# Patient Record
Sex: MALE | Race: WHITE | NOT HISPANIC OR LATINO | Employment: OTHER | URBAN - METROPOLITAN AREA
[De-identification: names, ages, dates, MRNs, and addresses within clinical notes are randomized per-mention and may not be internally consistent; named-entity substitution may affect disease eponyms.]

---

## 2022-01-24 ENCOUNTER — TRANSITIONAL CARE UNIT VISIT (OUTPATIENT)
Dept: GERIATRICS | Facility: CLINIC | Age: 82
End: 2022-01-24
Payer: COMMERCIAL

## 2022-01-24 VITALS
HEART RATE: 86 BPM | DIASTOLIC BLOOD PRESSURE: 85 MMHG | OXYGEN SATURATION: 96 % | SYSTOLIC BLOOD PRESSURE: 144 MMHG | TEMPERATURE: 98.5 F | RESPIRATION RATE: 16 BRPM | HEIGHT: 69 IN | BODY MASS INDEX: 22.22 KG/M2 | WEIGHT: 150 LBS

## 2022-01-24 DIAGNOSIS — G81.91 RIGHT HEMIPLEGIA (H): ICD-10-CM

## 2022-01-24 DIAGNOSIS — N18.30 STAGE 3 CHRONIC KIDNEY DISEASE, UNSPECIFIED WHETHER STAGE 3A OR 3B CKD (H): ICD-10-CM

## 2022-01-24 DIAGNOSIS — I63.81 LACUNAR INFARCTION (H): Primary | ICD-10-CM

## 2022-01-24 DIAGNOSIS — I63.9 CEREBROVASCULAR ACCIDENT (CVA), UNSPECIFIED MECHANISM (H): ICD-10-CM

## 2022-01-24 DIAGNOSIS — I10 HYPERTENSION, UNSPECIFIED TYPE: ICD-10-CM

## 2022-01-24 PROBLEM — F41.9 ANXIETY: Status: ACTIVE | Noted: 2019-05-10

## 2022-01-24 PROBLEM — E78.5 HYPERLIPIDEMIA: Status: ACTIVE | Noted: 2019-05-10

## 2022-01-24 PROCEDURE — 99309 SBSQ NF CARE MODERATE MDM 30: CPT | Performed by: NURSE PRACTITIONER

## 2022-01-24 RX ORDER — CLOPIDOGREL BISULFATE 75 MG/1
75 TABLET ORAL DAILY
COMMUNITY

## 2022-01-24 RX ORDER — ATORVASTATIN CALCIUM 40 MG/1
40 TABLET, FILM COATED ORAL AT BEDTIME
COMMUNITY

## 2022-01-24 RX ORDER — BENAZEPRIL HYDROCHLORIDE 10 MG/1
10 TABLET ORAL DAILY
COMMUNITY

## 2022-01-24 RX ORDER — ASPIRIN 81 MG/1
81 TABLET, CHEWABLE ORAL DAILY
COMMUNITY

## 2022-01-24 RX ORDER — SERTRALINE HYDROCHLORIDE 25 MG/1
25 TABLET, FILM COATED ORAL DAILY
COMMUNITY

## 2022-01-24 ASSESSMENT — MIFFLIN-ST. JEOR: SCORE: 1385.78

## 2022-01-24 NOTE — PROGRESS NOTES
[facility] GERIATRICS    PRIMARY CARE PROVIDER AND CLINIC:  [facility], [address] / [facility] [address]  Chief Complaint   Patient presents with     Hospital F/U      [place] Medical Record Number:  [id number]  Place of Service where encounter took place:  No question data found.    Mr. Jules Hart is a 81 y.o. male with a history of hypertension, hypertensive retinopathy, hypercholesterolemia, Hx TIA, history of CVA in 2011, presented with right arm numbness and leg weakness, not candidate for lytic, treated with aspirin and Plavix, no history of A. Fib, MRI brain showed chronic bilateral lacunar infarcts and small acute to early subacute infarction within the left corona radiata, suspect either embolic or thrombotic source, atorvastatin dose increased, EKG normal, Cardiac echo with bubble studies showed normal global systolic function, estimated EF 65-70%, no significant valve disease, negative bubble study, Loop recorder (LINQ) implanted 1/20 per neurology recommendations, no language/swallowing deficits, discharge to TCU for rehab.     Patient seen for follow up, lives in home with spouse, appears healthy, pleasant and oriented, walking to bathroom with walker and no assist, R UE and LE moderate weakness, can use a fork and ambulate slowly, neuro intact, loop recorder L mid chest dressing dry and intact, overall no distress.      CODE STATUS/ADVANCE DIRECTIVES DISCUSSION:  No Order  DNR only  ALLERGIES: No Known Allergies   PAST MEDICAL HISTORY: No past medical history on file.   PAST SURGICAL HISTORY:   has no past surgical history on file.  FAMILY HISTORY: family history is not on file.  SOCIAL HISTORY:     Patient's living condition: lives with spouse    Post Discharge Medication Reconciliation Status: discharge medications reconciled, continue medications without change  Current Outpatient Medications   Medication Sig     aspirin (ASA) 81 MG chewable tablet Take 81 mg  "by mouth daily     atorvastatin (LIPITOR) 40 MG tablet Take 40 mg by mouth At Bedtime     benazepril (LOTENSIN) 10 MG tablet Take 10 mg by mouth daily     clopidogrel (PLAVIX) 75 MG tablet Take 75 mg by mouth daily     sertraline (ZOLOFT) 25 MG tablet Take 25 mg by mouth daily     No current facility-administered medications for this visit.       ROS:  4 point ROS including Respiratory, CV, GI and , other than that noted in the HPI,  is negative    Vitals:  BP (!) 144/85   Pulse 86   Temp 98.5  F (36.9  C)   Resp 16   Ht 1.753 m (5' 9\")   Wt 68 kg (150 lb)   SpO2 96%   BMI 22.15 kg/m    Exam:  GENERAL APPEARANCE:  in no distress, appears healthy  ENT:  Mouth and posterior oropharynx normal, moist mucous membranes  RESP:  lungs clear to auscultation   CV:  no edema, rate-normal  ABDOMEN:  bowel sounds normal  M/S:   Gait and station abnormal using walker  SKIN:  Inspection of skin and subcutaneous tissue baseline  NEURO:   Examination of sensation by touch normal  PSYCH:  oriented X 3    Lab/Diagnostic data:  Labs done in SNF are in [place] [facility]. Please refer to them using [employer] Everywhere.    ASSESSMENT/PLAN:    (I63.81) Lacunar infarction (H)  (primary encounter diagnosis)  (I63.9) Cerebrovascular accident (CVA), unspecified mechanism (H)  (G81.91) Right hemiplegia (H)  Comment: previous CVA 2011, TIA approx 8 years ago, appears healthy  Plan:   -PT/OT eval and treat  -add APAP 1000mg TID PRN  -continue ASA81, statin increased frfom 10 to 40mg, started plavix  -of note, potential plavix end date in 30 days  -BMP on 1/25  -ordered 2WW today  -remove loop dressing on 1/28  -follow up neurology PRN      (N18.30) Stage 3 chronic kidney disease, unspecified whether stage 3a or 3b CKD (H)  Comment: GFR's in hospital 42-55  Plan: BMP on 1/25    (I10) Hypertension, unspecified type  Comment: SBP's in the 140 range  Plan: start benazapril 10mg on 1/28  -staff to check VS daily          Electronically signed " by:  NEAL Caicedo CNP

## 2022-01-24 NOTE — LETTER
1/24/2022        RE: Jules Hart  [address] [facility] [address]        [facility] GERIATRICS    PRIMARY CARE PROVIDER AND CLINIC:  [facility], [address]  / [facility] [address]  Chief Complaint   Patient presents with     Hospital F/U      [place] Medical Record Number:  [id number]  Place of Service where encounter took place:  No question data found.    Mr. Jules Hart is a 81 y.o. male with a history of hypertension, hypertensive retinopathy, hypercholesterolemia, Hx TIA, history of CVA in 2011, presented with right arm numbness and leg weakness, not candidate for lytic, treated with aspirin and Plavix, no history of A. Fib, MRI brain showed chronic bilateral lacunar infarcts and small acute to early subacute infarction within the left corona radiata, suspect either embolic or thrombotic source, atorvastatin dose increased, EKG normal, Cardiac echo with bubble studies showed normal global systolic function, estimated EF 65-70%, no significant valve disease, negative bubble study, Loop recorder (LINQ) implanted 1/20 per neurology recommendations, no language/swallowing deficits, discharge to TCU for rehab.     Patient seen for follow up, lives in home with spouse, appears healthy, pleasant and oriented, walking to bathroom with walker and no assist, R UE and LE moderate weakness, can use a fork and ambulate slowly, neuro intact, loop recorder L mid chest dressing dry and intact, overall no distress.      CODE STATUS/ADVANCE DIRECTIVES DISCUSSION:  No Order  DNR only  ALLERGIES: No Known Allergies   PAST MEDICAL HISTORY: No past medical history on file.   PAST SURGICAL HISTORY:   has no past surgical history on file.  FAMILY HISTORY: family history is not on file.  SOCIAL HISTORY:     Patient's living condition: lives with spouse    Post Discharge Medication Reconciliation Status: discharge medications reconciled, continue medications without  "change  Current Outpatient Medications   Medication Sig     aspirin (ASA) 81 MG chewable tablet Take 81 mg by mouth daily     atorvastatin (LIPITOR) 40 MG tablet Take 40 mg by mouth At Bedtime     benazepril (LOTENSIN) 10 MG tablet Take 10 mg by mouth daily     clopidogrel (PLAVIX) 75 MG tablet Take 75 mg by mouth daily     sertraline (ZOLOFT) 25 MG tablet Take 25 mg by mouth daily     No current facility-administered medications for this visit.       ROS:  4 point ROS including Respiratory, CV, GI and , other than that noted in the HPI,  is negative    Vitals:  BP (!) 144/85   Pulse 86   Temp 98.5  F (36.9  C)   Resp 16   Ht 1.753 m (5' 9\")   Wt 68 kg (150 lb)   SpO2 96%   BMI 22.15 kg/m    Exam:  GENERAL APPEARANCE:  in no distress, appears healthy  ENT:  Mouth and posterior oropharynx normal, moist mucous membranes  RESP:  lungs clear to auscultation   CV:  no edema, rate-normal  ABDOMEN:  bowel sounds normal  M/S:   Gait and station abnormal using walker  SKIN:  Inspection of skin and subcutaneous tissue baseline  NEURO:   Examination of sensation by touch normal  PSYCH:  oriented X 3    Lab/Diagnostic data:  Labs done in SNF are in [place] [facility]. Please refer to them using [employer] Everywhere.    ASSESSMENT/PLAN:    (I63.81) Lacunar infarction (H)  (primary encounter diagnosis)  (I63.9) Cerebrovascular accident (CVA), unspecified mechanism (H)  (G81.91) Right hemiplegia (H)  Comment: previous CVA 2011, TIA approx 8 years ago, appears healthy  Plan:   -PT/OT eval and treat  -add APAP 1000mg TID PRN  -continue ASA81, statin increased frfom 10 to 40mg, started plavix  -of note, potential plavix end date in 30 days  -BMP on 1/25  -ordered 2WW today  -remove loop dressing on 1/28  -follow up neurology PRN      (N18.30) Stage 3 chronic kidney disease, unspecified whether stage 3a or 3b CKD (H)  Comment: GFR's in hospital 42-55  Plan: BMP on 1/25    (I10) Hypertension, unspecified type  Comment: SBP's in the " 140 range  Plan: start benazapril 10mg on 1/28  -staff to check VS daily          Electronically signed by:  NEAL Caicedo CNP                       Sincerely,        NEAL Caicedo CNP

## 2022-01-26 ENCOUNTER — TRANSITIONAL CARE UNIT VISIT (OUTPATIENT)
Dept: GERIATRICS | Facility: CLINIC | Age: 82
End: 2022-01-26
Payer: COMMERCIAL

## 2022-01-26 VITALS
RESPIRATION RATE: 18 BRPM | HEART RATE: 83 BPM | TEMPERATURE: 97.8 F | DIASTOLIC BLOOD PRESSURE: 76 MMHG | BODY MASS INDEX: 22.28 KG/M2 | HEIGHT: 69 IN | SYSTOLIC BLOOD PRESSURE: 141 MMHG | WEIGHT: 150.4 LBS | OXYGEN SATURATION: 97 %

## 2022-01-26 DIAGNOSIS — G81.91 RIGHT HEMIPLEGIA (H): ICD-10-CM

## 2022-01-26 DIAGNOSIS — N18.30 STAGE 3 CHRONIC KIDNEY DISEASE, UNSPECIFIED WHETHER STAGE 3A OR 3B CKD (H): ICD-10-CM

## 2022-01-26 DIAGNOSIS — I63.9 CEREBROVASCULAR ACCIDENT (CVA), UNSPECIFIED MECHANISM (H): Primary | ICD-10-CM

## 2022-01-26 PROCEDURE — 99309 SBSQ NF CARE MODERATE MDM 30: CPT | Performed by: NURSE PRACTITIONER

## 2022-01-26 ASSESSMENT — MIFFLIN-ST. JEOR: SCORE: 1387.59

## 2022-01-26 NOTE — LETTER
"    1/26/2022        RE: Jules Hart  [address] [facility] [address]        [facility] GERIATRICS    Chief Complaint   Patient presents with     RECHECK     HPI:  Jules Hart is a 81 year old  (1940), who is being seen today for an episodic care visit at: [facility] ([facility] [[id number]]. Today's concern is:   1. Cerebrovascular accident (CVA), unspecified mechanism (H)    2. Right hemiplegia (H)    3. Stage 3 chronic kidney disease, unspecified whether stage 3a or 3b CKD (H)      Patient seen today for follow up, working with therapies, wants to return home this week, able to do stairs with assist, recent CVA with R sided weakness, appears to be safety aware and understand limitations, labs on 1/24 creat 1.43, GFR 50, no distress.    Allergies, and PMH/PSH reviewed in EPIC today.  REVIEW OF SYSTEMS:  4 point ROS including Respiratory, CV, GI and , other than that noted in the HPI,  is negative    Objective:   BP (!) 141/76   Pulse 83   Temp 97.8  F (36.6  C)   Resp 18   Ht 1.753 m (5' 9\")   Wt 68.2 kg (150 lb 6.4 oz)   SpO2 97%   BMI 22.21 kg/m    GENERAL APPEARANCE:  in no distress, appears healthy  ENT:  Mouth and posterior oropharynx normal, moist mucous membranes  RESP:  lungs clear to auscultation   CV:  no edema, rate-normal  ABDOMEN:  bowel sounds normal  M/S:   Gait and station abnormal transfer assist  SKIN:  Inspection of skin and subcutaneous tissue baseline  NEURO:   Examination of sensation by touch normal  PSYCH:  oriented X 3    Labs done in SNF are in [facility]. Please refer to them using [employer]/Care Everywhere.    Assessment/Plan:  (I63.9) Cerebrovascular accident (CVA), unspecified mechanism (H)  (primary encounter diagnosis)  (G81.91) Right hemiplegia (H)  Comment: status improving, may be able to discharge home with spouse this week  Plan:   -therapies to continue  -follow up neurology PRN  -continue ASA81, plavix  -of note, plavix " initially for 30 days only, should maintain plavix until neurology appt  -plan to visit later this week RE: status and plan    (N18.30) Stage 3 chronic kidney disease, unspecified whether stage 3a or 3b CKD (H)  Comment: 1/24 creat 1.43, GFR 50  Plan:   -dose medications renally as possible  -follow up with PCP RE: periodic BMP's        Electronically signed by: NEAL Caicedo CNP             Sincerely,        NEAL Caicedo CNP

## 2022-01-26 NOTE — PROGRESS NOTES
"[facility] GERIATRICS    Chief Complaint   Patient presents with     RECHECK     HPI:  Jules Hart is a 81 year old  (1940), who is being seen today for an episodic care visit at: [facility] ([facility]) [[id number]]. Today's concern is:   1. Cerebrovascular accident (CVA), unspecified mechanism (H)    2. Right hemiplegia (H)    3. Stage 3 chronic kidney disease, unspecified whether stage 3a or 3b CKD (H)      Patient seen today for follow up, working with therapies, wants to return home this week, able to do stairs with assist, recent CVA with R sided weakness, appears to be safety aware and understand limitations, labs on 1/24 creat 1.43, GFR 50, no distress.    Allergies, and PMH/PSH reviewed in EPIC today.  REVIEW OF SYSTEMS:  4 point ROS including Respiratory, CV, GI and , other than that noted in the HPI,  is negative    Objective:   BP (!) 141/76   Pulse 83   Temp 97.8  F (36.6  C)   Resp 18   Ht 1.753 m (5' 9\")   Wt 68.2 kg (150 lb 6.4 oz)   SpO2 97%   BMI 22.21 kg/m    GENERAL APPEARANCE:  in no distress, appears healthy  ENT:  Mouth and posterior oropharynx normal, moist mucous membranes  RESP:  lungs clear to auscultation   CV:  no edema, rate-normal  ABDOMEN:  bowel sounds normal  M/S:   Gait and station abnormal transfer assist  SKIN:  Inspection of skin and subcutaneous tissue baseline  NEURO:   Examination of sensation by touch normal  PSYCH:  oriented X 3    Labs done in SNF are in [facility]. Please refer to them using [facility]/Care Everywhere.    Assessment/Plan:  (I63.9) Cerebrovascular accident (CVA), unspecified mechanism (H)  (primary encounter diagnosis)  (G81.91) Right hemiplegia (H)  Comment: status improving, may be able to discharge home with spouse this week  Plan:   -therapies to continue  -follow up neurology PRN  -continue ASA81, plavix  -of note, plavix initially for 30 days only, should maintain plavix until neurology appt  -plan to visit later this " week RE: status and plan    (N18.30) Stage 3 chronic kidney disease, unspecified whether stage 3a or 3b CKD (H)  Comment: 1/24 creat 1.43, GFR 50  Plan:   -dose medications renally as possible  -follow up with PCP RE: periodic BMP's        Electronically signed by: NEAL Caicedo CNP

## 2022-01-27 NOTE — PROGRESS NOTES
[facility] GERIATRICS DISCHARGE SUMMARY  Patient Name: Jules Hrat  YOB: 1940  [place] Medical Record Number: [id number]  Place of Service Where Encounter Took Place: [facility] [facility] [[id number]]    PRIMARY CARE PROVIDER AND CLINIC RESPONSIBLE AFTER TRANSFER: Delilah Tolliver MD, [address] / [place] [address]; Non-[facility] Provider     Transferring providers: NEAL Zuniga CNP; Kelvin Perry MD  Recent Hospitalization/ED: [facility]  stay 1/18/22 to 1/22/22.  Date of SNF Admission: 1/22/2022  Date of SNF (anticipated) Discharge: January 29, 2022  Discharged to: previous independent home  Cognitive Scores: SLUMS: 24/30  Physical Function: Pivot transfers  DME: Walker    CODE STATUS/ADVANCE DIRECTIVES DISCUSSION: No Order - DNR Only  ALLERGIES: Patient has no known allergies.    NURSING FACILITY COURSE   Medication Changes/Rationale:     See notes    Summary of nursing facility stay:   Lacunar infarction (H)  Cerebrovascular accident (CVA), unspecified mechanism (H)  Right hemiplegia (H)  Stage 3 chronic kidney disease, unspecified whether stage 3a or 3b CKD (H)  Hypertension, unspecified type      (I63.81) Lacunar infarction (H)  (primary encounter diagnosis)  (I63.9) Cerebrovascular accident (CVA), unspecified mechanism (H)  (G81.91) Right hemiplegia (H)  Comment: previous CVA 2011, TIA approx 8 years ago, appears healthy  Plan:   -PT/OT eval and treat  -add APAP 1000mg TID PRN  -continue ASA81, statin increased from 10 to 40mg, started plavix  -of note, potential plavix end date in 30 days  -BMP on 1/25  -ordered 2WW today  -removed loop dressing on 1/28  -follow up neurology PRN        (N18.30) Stage 3 chronic kidney disease, unspecified whether stage 3a or 3b CKD (H)  Comment: GFR's in hospital 42-55  Plan: BMP on 1/25 creat 1.43, GFR 50     (I10) Hypertension, unspecified type  Comment: SBP's in the 140 range  Plan: start  "benazapril 10mg on 1/28  -check VS periodically       Discharge Medications:  Current Outpatient Medications   Medication Sig Dispense Refill     aspirin (ASA) 81 MG chewable tablet Take 81 mg by mouth daily       atorvastatin (LIPITOR) 40 MG tablet Take 40 mg by mouth At Bedtime       benazepril (LOTENSIN) 10 MG tablet Take 10 mg by mouth daily       clopidogrel (PLAVIX) 75 MG tablet Take 75 mg by mouth daily       sertraline (ZOLOFT) 25 MG tablet Take 25 mg by mouth daily       Controlled medications:   not applicable/none     Past Medical History: No past medical history on file.  Physical Exam:   Vitals: /76   Pulse 75   Temp 97.6  F (36.4  C)   Resp 16   Ht 1.753 m (5' 9\")   Wt 68.2 kg (150 lb 6.4 oz)   SpO2 96%   BMI 22.21 kg/m    BMI: Body mass index is 22.21 kg/m .  GENERAL APPEARANCE:  in no distress, appears healthy  ENT:  Mouth and posterior oropharynx normal, moist mucous membranes  RESP:  lungs clear to auscultation   CV:  no edema  ABDOMEN:  bowel sounds normal  M/S:   Gait and station abnormal using walker  SKIN:  Inspection of skin and subcutaneous tissue baseline  NEURO:   Examination of sensation by touch normal  PSYCH:  oriented X 3     SNF labs: Labs done in SNF are in [facility]. Please refer to them using [facility]/Care Everywhere.    DISCHARGE PLAN:    Follow up labs: No labs orders/due    Medical Follow Up:     Follow up with primary care provider in 1 weeks  Current [place] scheduled appointments: None.    Discharge Services: Home Care: Physical Therapy, Occupational Therapy, Registered Nurse, Home Health Aide.    Discharge Instructions Verbalized to Patient at Discharge:     None    TOTAL DISCHARGE TIME: Greater than 30 minutes  Electronically signed by:  NEAL Caicedo CNP     Documentation of Face to Face and Certification for Home Health Services    I certify that services are/were furnished while this patient was under the care of a physician and that a " physician or an allowed non-physician practitioner (NPP), had a face-to-face encounter that meets the physician face-to-face encounter requirements. The encounter was in whole, or in part, related to the primary reason for home health. The patient is confined to his/her home and needs intermittent skilled nursing, physical therapy, speech-language pathology, or the continued need for occupational therapy. A plan of care has been established by a physician and is periodically reviewed by a physician.  Date of Face-to-Face Encounter: 1/28/2022.    I certify that, based on my findings, the following services are medically necessary home health services: Nursing, Occupational Therapy, Physical Therapy and HHA.    My clinical findings support the need for the above skilled services because: Requires assistance of another person or specialized equipment to access medical services because patient: Is unable to operate assistive equipment on their own...    Patient to re-establish plan of care with their PCP within 7-10 days after leaving the facility to reestablish care.  Medicare certified RHYS provider: NEAL Caicedo CNP  Date: January 27, 2022

## 2022-01-28 ENCOUNTER — DISCHARGE SUMMARY NURSING HOME (OUTPATIENT)
Dept: GERIATRICS | Facility: CLINIC | Age: 82
End: 2022-01-28
Payer: COMMERCIAL

## 2022-01-28 VITALS
HEART RATE: 75 BPM | HEIGHT: 69 IN | BODY MASS INDEX: 22.28 KG/M2 | TEMPERATURE: 97.6 F | RESPIRATION RATE: 16 BRPM | WEIGHT: 150.4 LBS | SYSTOLIC BLOOD PRESSURE: 132 MMHG | OXYGEN SATURATION: 96 % | DIASTOLIC BLOOD PRESSURE: 76 MMHG

## 2022-01-28 DIAGNOSIS — I63.9 CEREBROVASCULAR ACCIDENT (CVA), UNSPECIFIED MECHANISM (H): ICD-10-CM

## 2022-01-28 DIAGNOSIS — N18.30 STAGE 3 CHRONIC KIDNEY DISEASE, UNSPECIFIED WHETHER STAGE 3A OR 3B CKD (H): ICD-10-CM

## 2022-01-28 DIAGNOSIS — I10 HYPERTENSION, UNSPECIFIED TYPE: ICD-10-CM

## 2022-01-28 DIAGNOSIS — G81.91 RIGHT HEMIPLEGIA (H): ICD-10-CM

## 2022-01-28 DIAGNOSIS — I63.81 LACUNAR INFARCTION (H): Primary | ICD-10-CM

## 2022-01-28 PROCEDURE — 99316 NF DSCHRG MGMT 30 MIN+: CPT | Performed by: NURSE PRACTITIONER

## 2022-01-28 ASSESSMENT — MIFFLIN-ST. JEOR: SCORE: 1387.59

## 2022-01-28 NOTE — LETTER
1/28/2022        RE: Jules Hart  [address] [facility] [place] [address]        [facility] GERIATRICS DISCHARGE SUMMARY  Patient Name: Jules Hart  YOB: 1940  [place] Medical Record Number: [id number]  Place of Service Where Encounter Took Place: [facility] ([facility]) [[id number]]    PRIMARY CARE PROVIDER AND CLINIC RESPONSIBLE AFTER TRANSFER: Delilah Tolliver MD, [address] / [place] [address]; Non-[facility]G Provider     Transferring providers: NEAL Zuniga CNP; Kelvin Perry MD  Recent Hospitalization/ED: [facility]  stay 1/18/22 to 1/22/22.  Date of SNF Admission: 1/22/2022  Date of SNF (anticipated) Discharge: January 29, 2022  Discharged to: previous independent home  Cognitive Scores: SLUMS: 24/30  Physical Function: Pivot transfers  DME: Walker    CODE STATUS/ADVANCE DIRECTIVES DISCUSSION: No Order - DNR Only  ALLERGIES: Patient has no known allergies.    NURSING FACILITY COURSE   Medication Changes/Rationale:     See notes    Summary of nursing facility stay:   Lacunar infarction (H)  Cerebrovascular accident (CVA), unspecified mechanism (H)  Right hemiplegia (H)  Stage 3 chronic kidney disease, unspecified whether stage 3a or 3b CKD (H)  Hypertension, unspecified type      (I63.81) Lacunar infarction (H)  (primary encounter diagnosis)  (I63.9) Cerebrovascular accident (CVA), unspecified mechanism (H)  (G81.91) Right hemiplegia (H)  Comment: previous CVA 2011, TIA approx 8 years ago, appears healthy  Plan:   -PT/OT eval and treat  -add APAP 1000mg TID PRN  -continue ASA81, statin increased from 10 to 40mg, started plavix  -of note, potential plavix end date in 30 days  -BMP on 1/25  -ordered 2WW today  -removed loop dressing on 1/28  -follow up neurology PRN        (N18.30) Stage 3 chronic kidney disease, unspecified whether stage 3a or 3b CKD (H)  Comment: GFR's in hospital 42-55  Plan: BMP on 1/25 creat  "1.43, GFR 50     (I10) Hypertension, unspecified type  Comment: SBP's in the 140 range  Plan: start benazapril 10mg on 1/28  -check VS periodically       Discharge Medications:  Current Outpatient Medications   Medication Sig Dispense Refill     aspirin (ASA) 81 MG chewable tablet Take 81 mg by mouth daily       atorvastatin (LIPITOR) 40 MG tablet Take 40 mg by mouth At Bedtime       benazepril (LOTENSIN) 10 MG tablet Take 10 mg by mouth daily       clopidogrel (PLAVIX) 75 MG tablet Take 75 mg by mouth daily       sertraline (ZOLOFT) 25 MG tablet Take 25 mg by mouth daily       Controlled medications:   not applicable/none     Past Medical History: No past medical history on file.  Physical Exam:   Vitals: /76   Pulse 75   Temp 97.6  F (36.4  C)   Resp 16   Ht 1.753 m (5' 9\")   Wt 68.2 kg (150 lb 6.4 oz)   SpO2 96%   BMI 22.21 kg/m    BMI: Body mass index is 22.21 kg/m .  GENERAL APPEARANCE:  in no distress, appears healthy  ENT:  Mouth and posterior oropharynx normal, moist mucous membranes  RESP:  lungs clear to auscultation   CV:  no edema  ABDOMEN:  bowel sounds normal  M/S:   Gait and station abnormal using walker  SKIN:  Inspection of skin and subcutaneous tissue baseline  NEURO:   Examination of sensation by touch normal  PSYCH:  oriented X 3     SNF labs: Labs done in SNF are in [place] EP[facility]. Please refer to them using EPIC/Care Everywhere.    DISCHARGE PLAN:    Follow up labs: No labs orders/due    Medical Follow Up:     Follow up with primary care provider in 1 weeks  Current [place] scheduled appointments: None.    Discharge Services: Home Care: Physical Therapy, Occupational Therapy, Registered Nurse, Home Health Aide.    Discharge Instructions Verbalized to Patient at Discharge:     None    TOTAL DISCHARGE TIME: Greater than 30 minutes  Electronically signed by:  NEAL Caicedo CNP     Documentation of Face to Face and Certification for Home Health Services    I certify " that services are/were furnished while this patient was under the care of a physician and that a physician or an allowed non-physician practitioner (NPP), had a face-to-face encounter that meets the physician face-to-face encounter requirements. The encounter was in whole, or in part, related to the primary reason for home health. The patient is confined to his/her home and needs intermittent skilled nursing, physical therapy, speech-language pathology, or the continued need for occupational therapy. A plan of care has been established by a physician and is periodically reviewed by a physician.  Date of Face-to-Face Encounter: 1/28/2022.    I certify that, based on my findings, the following services are medically necessary home health services: Nursing, Occupational Therapy, Physical Therapy and HHA.    My clinical findings support the need for the above skilled services because: Requires assistance of another person or specialized equipment to access medical services because patient: Is unable to operate assistive equipment on their own...    Patient to re-establish plan of care with their PCP within 7-10 days after leaving the facility to reestablish care.  Medicare certified PECOS provider: NEAL Caicedo CNP  Date: January 27, 2022        Sincerely,        NEAL Caicedo CNP